# Patient Record
Sex: FEMALE | HISPANIC OR LATINO | Employment: UNEMPLOYED | URBAN - METROPOLITAN AREA
[De-identification: names, ages, dates, MRNs, and addresses within clinical notes are randomized per-mention and may not be internally consistent; named-entity substitution may affect disease eponyms.]

---

## 2024-08-26 ENCOUNTER — HOSPITAL ENCOUNTER (EMERGENCY)
Facility: CLINIC | Age: 29
Discharge: HOME | End: 2024-08-26

## 2024-08-26 VITALS
OXYGEN SATURATION: 98 % | DIASTOLIC BLOOD PRESSURE: 81 MMHG | SYSTOLIC BLOOD PRESSURE: 119 MMHG | RESPIRATION RATE: 18 BRPM | TEMPERATURE: 98.3 F | WEIGHT: 121.25 LBS | HEART RATE: 75 BPM

## 2024-08-26 DIAGNOSIS — L70.0 ACNE VULGARIS: ICD-10-CM

## 2024-08-26 DIAGNOSIS — L73.9 FOLLICULITIS: ICD-10-CM

## 2024-08-26 PROCEDURE — 99283 EMERGENCY DEPT VISIT LOW MDM: CPT

## 2024-08-26 RX ORDER — DOXYCYCLINE 100 MG/1
100 CAPSULE ORAL 2 TIMES DAILY
Qty: 20 CAPSULE | Refills: 0 | Status: SHIPPED | OUTPATIENT
Start: 2024-08-26 | End: 2024-09-05

## 2024-08-26 RX ORDER — BENZOYL PEROXIDE 10 G/100G
SUSPENSION TOPICAL EVERY MORNING
Qty: 187 G | Refills: 0 | Status: SHIPPED | OUTPATIENT
Start: 2024-08-26

## 2024-08-26 RX ORDER — CLINDAMYCIN PHOSPHATE 10 UG/ML
LOTION TOPICAL 2 TIMES DAILY
Qty: 60 ML | Refills: 0 | Status: SHIPPED | OUTPATIENT
Start: 2024-08-26

## 2024-08-26 ASSESSMENT — COLUMBIA-SUICIDE SEVERITY RATING SCALE - C-SSRS
2. HAVE YOU ACTUALLY HAD ANY THOUGHTS OF KILLING YOURSELF IN THE PAST MONTH?: NO
6. HAVE YOU EVER DONE ANYTHING, STARTED TO DO ANYTHING, OR PREPARED TO DO ANYTHING TO END YOUR LIFE?: NO
1. IN THE PAST MONTH, HAVE YOU WISHED YOU WERE DEAD OR WISHED YOU COULD GO TO SLEEP AND NOT WAKE UP?: NO

## 2024-08-26 NOTE — ED TRIAGE NOTES
Patient reports facial rash that started 2 months ago when she came here from [facility]. Rash started on her face and has been spreading down to her chest. Reports cough as well. Reports having her vaccines as a child.

## 2024-08-26 NOTE — DISCHARGE INSTRUCTIONS
PanOxyl wash in the morning  Clindamycin lotion at night  Doxycyline twice daily for 10 days    Do NOT get pregnant while on this medication    Follow up with Dermatology      Lavado PanOxyl por la mañana.  Loción de clindamicina de noche  Doxiciclina dos veces al día yu 10 días.    NO quede embarazada mientras esté tomando david medicamento.    Seguimiento con Dermatología

## 2024-08-26 NOTE — ED PROVIDER NOTES
Emergency Department Note      History of Present Illness     Chief Complaint   Rash      HPI   Iris Shannon Anand is a 29 year old female who presents with a rash. When she arrived in the [place] 2 months ago she began to develop pimples, inflammation on her face that has progressively worsened. The rash has now spread to her scalp and central chest which is painful and keeping her up at night. She denies any chance of pregnancy. She has not been having any fevers.  Has tried a topical antibiotic without improvement.  Reports otherwise generally healthy and UTD on all immunizations.  She has not been having any documented fevers.  Aside from coming to [place], she has not had any other recent travel.  She denies joint pains.  No nausea or vomiting.  She does not have any known allergies.  Aside from the topical antibiotic ointment, she has not had any other recent new medications.  No recent illness.    Independent Historian   None    Review of External Notes   None     Past Medical History     Medical History and Problem List   No past medical history on file.    Medications   No current outpatient medications on file.      Surgical History   No past surgical history on file.    Physical Exam     Patient Vitals for the past 24 hrs:   BP Temp Pulse Resp SpO2 Weight   08/26/24 1514 119/81 -- -- -- -- --   08/26/24 1513 -- 98.3  F (36.8  C) -- -- -- --   08/26/24 1511 -- -- 75 18 98 % --   08/26/24 1509 -- -- -- -- -- 55 kg (121 lb 4.1 oz)     Physical Exam  /81   Pulse 75   Temp 98.3  F (36.8  C)   Resp 18   Wt 55 kg (121 lb 4.1 oz)   SpO2 98%    General: Appears stated age. Examined in room 7.  Head: Atraumatic.   EENT: Moist mucus membranes.   CV: Regular rate.  Respiratory: Breathing comfortably on room air.  Normal work of breathing.  Msk: No obvious deformity.   Skin: Erythematous papules, pustules, and nodules.  There are tender nodules on the scalp with central hair that are  tender to touch.          Neuro: Awake, alert, and conversant.  Psych: Appropriate mood and affect.    Diagnostics     Lab Results   Labs Ordered and Resulted from Time of ED Arrival to Time of ED Departure - No data to display    Imaging   No orders to display       EKG   None     Independent Interpretation   None    ED Course      Medications Administered   Medications - No data to display    Procedures   Procedures     Discussion of Management   None    ED Course   ED Course as of 08/26/24 1735   Mon Aug 26, 2024   1717 I initially assessed the patient and obtained the above history and physical exam.       Additional Documentation  None    Medical Decision Making / Diagnosis     CMS Diagnoses: None    MIPS       None    MDM   Iris Shannon Anand is a 29 year old female presenting today with skin changes consistent with acne vulgaris as well as lesions on the scalp that are more concerning for a folliculitis.  Patient outlines many stressors associated with this and feels like she cannot leave her house due to the way her skin looks.  Differential is broad and includes infectious etiologies such as SJS or TEN as well as acne vulgaris, folliculitis, considered the possibility of tropical or other infectious diseases.  Though, with the distribution of the lesions, papules, pustules and nodules, I think this is most likely consistent with an inflammatory acne vulgaris with cystic features.  She does also have evidence of folliculitis on her scalp.  Will treat her with doxycycline twice daily for 10 days.  I discussed at this patient that this is not a definitive solution and she will likely need  prolonged management of the acne that she has.  She will  a benzyl peroxide wash as this in the morning as well as a clindamycin lotion at night.  She given that she is not pregnant.  Referral to dermatology placed.  There is no evidence suggestive of a bacterial infection.  No recent medications to suggest  SJS or TEN.    Findings and plan were discussed in detail with the patient, she was in agreement with the plan and she is discharged home in stable condition.    Disposition   The patient was discharged.     Diagnosis     ICD-10-CM    1. Acne vulgaris  L70.0       2. Folliculitis  L73.9            Discharge Medications   New Prescriptions    No medications on file     Scribe Disclosure:  WEI, Noble Isabel, am serving as a scribe at 5:34 PM on 8/26/2024 to document services personally performed by Luisa Bill PA-C based on my observations and the provider's statements to me.        Luisa Bill PA-C  08/27/24 0003